# Patient Record
Sex: FEMALE | Race: BLACK OR AFRICAN AMERICAN
[De-identification: names, ages, dates, MRNs, and addresses within clinical notes are randomized per-mention and may not be internally consistent; named-entity substitution may affect disease eponyms.]

---

## 2019-11-07 ENCOUNTER — HOSPITAL ENCOUNTER (INPATIENT)
Dept: HOSPITAL 92 - L&D/OP | Age: 34
LOS: 5 days | Discharge: HOME | End: 2019-11-12
Attending: OBSTETRICS & GYNECOLOGY | Admitting: OBSTETRICS & GYNECOLOGY
Payer: COMMERCIAL

## 2019-11-07 VITALS — BODY MASS INDEX: 30.2 KG/M2

## 2019-11-07 DIAGNOSIS — Z3A.36: ICD-10-CM

## 2019-11-07 DIAGNOSIS — K80.20: ICD-10-CM

## 2019-11-07 LAB
A1 MICROGLOB PLACENTAL VAG QL: (no result)
ALBUMIN SERPL BCG-MCNC: 3.8 G/DL (ref 3.5–5)
ALP SERPL-CCNC: 260 U/L (ref 40–110)
ALT SERPL W P-5'-P-CCNC: 16 U/L (ref 8–55)
AMNISURE INTERNAL CONTROL QC: (no result)
ANION GAP SERPL CALC-SCNC: 15 MMOL/L (ref 10–20)
AST SERPL-CCNC: 26 U/L (ref 5–34)
BILIRUB SERPL-MCNC: 0.4 MG/DL (ref 0.2–1.2)
BUN SERPL-MCNC: 4 MG/DL (ref 7–18.7)
CALCIUM SERPL-MCNC: 9.6 MG/DL (ref 7.8–10.44)
CHLORIDE SERPL-SCNC: 108 MMOL/L (ref 98–107)
CO2 SERPL-SCNC: 19 MMOL/L (ref 22–29)
CREAT CL PREDICTED SERPL C-G-VRATE: 169 ML/MIN (ref 70–130)
GLOBULIN SER CALC-MCNC: 2.5 G/DL (ref 2.4–3.5)
GLUCOSE SERPL-MCNC: 89 MG/DL (ref 70–105)
HBSAG INDEX: 0.17 S/CO (ref 0–0.99)
HGB BLD-MCNC: 12.8 G/DL (ref 12–16)
MCH RBC QN AUTO: 29.4 PG (ref 27–31)
MCV RBC AUTO: 85.1 FL (ref 78–98)
PLATELET # BLD AUTO: 152 THOU/UL (ref 130–400)
POTASSIUM SERPL-SCNC: 4.3 MMOL/L (ref 3.5–5.1)
RBC # BLD AUTO: 4.36 MILL/UL (ref 4.2–5.4)
SODIUM SERPL-SCNC: 138 MMOL/L (ref 136–145)
SYPHILIS ANTIBODY INDEX: 0.03 S/CO
WBC # BLD AUTO: 6 THOU/UL (ref 4.8–10.8)

## 2019-11-07 PROCEDURE — 88307 TISSUE EXAM BY PATHOLOGIST: CPT

## 2019-11-07 PROCEDURE — 84112 EVAL AMNIOTIC FLUID PROTEIN: CPT

## 2019-11-07 PROCEDURE — 87340 HEPATITIS B SURFACE AG IA: CPT

## 2019-11-07 PROCEDURE — 51702 INSERT TEMP BLADDER CATH: CPT

## 2019-11-07 PROCEDURE — 85027 COMPLETE CBC AUTOMATED: CPT

## 2019-11-07 PROCEDURE — 83735 ASSAY OF MAGNESIUM: CPT

## 2019-11-07 PROCEDURE — 86900 BLOOD TYPING SEROLOGIC ABO: CPT

## 2019-11-07 PROCEDURE — 86901 BLOOD TYPING SEROLOGIC RH(D): CPT

## 2019-11-07 PROCEDURE — 86780 TREPONEMA PALLIDUM: CPT

## 2019-11-07 PROCEDURE — 36415 COLL VENOUS BLD VENIPUNCTURE: CPT

## 2019-11-07 PROCEDURE — 85025 COMPLETE CBC W/AUTO DIFF WBC: CPT

## 2019-11-07 PROCEDURE — 86850 RBC ANTIBODY SCREEN: CPT

## 2019-11-07 PROCEDURE — 36416 COLLJ CAPILLARY BLOOD SPEC: CPT

## 2019-11-07 PROCEDURE — 99285 EMERGENCY DEPT VISIT HI MDM: CPT

## 2019-11-07 PROCEDURE — 80053 COMPREHEN METABOLIC PANEL: CPT

## 2019-11-07 NOTE — HP
TIME OF ADMISSION:  1855 hours.



REASON FOR ADMISSION:  A 35 weeks and 6 days gestation with spontaneous rupture of

membranes. 



HISTORY OF PRESENT ILLNESS:  Ms. Durham is a 34-year-old  1, para 0, with EDC

of , who sees Jelena Almeida and Aparna Telles at [facility].  She has had a pregnancy complicated by cholelithiasis, White classification

A1 diabetes with good diet control, mild preeclampsia with proteinuria, and blood

pressures of 140s over 90s in the office.  She reports leakage of fluid this

afternoon.  Upon presentation to the hospital, she was found to have a positive

AmniSure.  Of note, the patient reports she had an ultrasound 2 days ago at [facility], it revealed cephalic presentation with 5-1/2 pounds fetus.

This ultrasound report is not available in the obstetrical record. 



OB/GYN HISTORY:  Blood type B positive.  Antibody negative.  Pap negative.  Rubella

immune.  VDRL nonreactive.  Hepatitis B, GC, chlamydia, HIV negative.  Hemoglobin

electrophoresis within normal limits.  The patient had ASCUS Pap smear. Negative

cystic fibrosis screen.  Negative urine drug screen.  Negative second trimester

serum screening.  She had an elevated 1-hour GTT of 227.  Group B strep has not been

done.  EDC is based on 13-week ultrasound. 



MEDICAL HISTORY:  The patient has known cholelithiasis with __________ pregnancy has

not been symptomatic of recent.  The patient has a history of osteonecrosis of the

right femoral head from childhood is not related to steroid use. 



PAST SURGICAL HISTORY:  None.



ALLERGIES:  NONE.



MEDICATIONS:  Prenatal vitamins.



SOCIAL HISTORY:  Denies tobacco, alcohol, or IV drug abuse.



FAMILY HISTORY:  Noncontributory.



REVIEW OF SYSTEMS:  Noncontributory.



PHYSICAL EXAMINATION:

GENERAL:  Pleasant black female, in no acute distress. 

VITAL SIGNS:  Blood pressure 138/88, 187 pounds, trace edema, pulse 85, respirations

18, temperature 98.6. 

HEENT:  Within normal limits. 

LUNGS:  Clear to auscultation bilaterally. 

HEART:  Regular rhythm. 

ABDOMEN:  Soft and nontender with a fundal height of 36.  FHTs 140s. 

VULVA:  Without lesions. 

VAGINA:  Clear fluid. 

CERVIX:  Fingertip 20% effaced, -4, cephalic posterior, and soft. 

EXTREMITIES:  Without clubbing, cyanosis, or edema. 



Fetal monitoring reveals contractions approximately every 5 minutes, category I

fetal heart rate tracing. 



IMPRESSION:  

1. 35 to 36 weeks gestation.

2. Premature rupture of membranes.

3. White class A1 diabetes.

4. Mild preeclampsia.

5. Cholelithiasis.

6. History of osteonecrosis of the hip.



PLAN:  As the patient is 35 to 36 weeks gestation, we will administer 

corticosteroids.  The patient's osteonecrosis is not related to steroid use and so

there should be no contraindication to use of  corticosteroids just because

of the patient's history of osteonecrosis.  We will check PIH labs and will follow

blood pressures.  If severe range features are noted, we will administer magnesium

sulfate.  The patient's diabetes is diet control and no insulin use or serial blood

sugars are anticipated.  Because of pre-36-week rupture of membranes, we will

administer penicillin for group B strep prophylaxis.  We will consider

discontinuation in a.m. if patient is still not in labor.  If the patient progresses

without being in labor approximately 12 hours after initial  corticosteroid

dose, may consider oral Cytotec and possibly accelerated second dose of 

corticosteroids.  We will perform  delivery for maternal or fetal

indications, but none for the  delivery present at this time.  Care plans

discussed with Jelena Almeida, certified nurse midwife. 







Job ID:  [id number]

## 2019-11-08 RX ADMIN — Medication SCH MLS: at 13:28

## 2019-11-08 RX ADMIN — MAGNESIUM SULFATE HEPTAHYDRATE SCH MLS: 40 INJECTION, SOLUTION INTRAVENOUS at 15:23

## 2019-11-08 RX ADMIN — Medication SCH MLS: at 17:35

## 2019-11-08 RX ADMIN — Medication SCH MLS: at 00:18

## 2019-11-08 RX ADMIN — MAGNESIUM SULFATE HEPTAHYDRATE SCH MLS: 40 INJECTION, SOLUTION INTRAVENOUS at 23:26

## 2019-11-08 RX ADMIN — Medication SCH MLS: at 09:05

## 2019-11-08 RX ADMIN — Medication SCH MLS: at 04:36

## 2019-11-08 RX ADMIN — Medication SCH MLS: at 21:06

## 2019-11-08 NOTE — PDOC.LDPN
Labor & Delivery Progress Note





- Subjective


Subjective: painful contractions





- Objective


Abnormal vital signs: severe range pressures 168/90


General: breathing through contractions


Uterine fundus: non tender


Dilation: 2


Effacement: 90%


Station: -1


FHT: category 1


[place] contractions every: q2-2.5 mins





- Assessment


(1) Preeclampsia


Code(s): O14.90 - UNSPECIFIED PRE-ECLAMPSIA, UNSPECIFIED TRIMESTER   Current 

Visit: Yes   Status: Acute   





(2)  premature rupture of membranes


Code(s): O42.919 - PRETRM ALEXA ROM, UNSP TIME BETW RUPT AND ONST LABR, UNSP TRI

   Current Visit: Yes   Status: Acute   


Plan: pitocin for augmentation (Strongly recommended epidural to help low blood 

pressure as well as control pain. patient declined.)


-: 





Order for IV hydralazine [id number] for severe range BPs.

## 2019-11-08 NOTE — PRG
DATE OF SERVICE:  2019



SUBJECTIVE:  The patient has been resting comfortably through the night.  She has

had elevated blood pressures throughout.  She required Apresoline x1 dose at

approximately around midnight for systolic greater than 160.  Since then, patient's

systolics have been in the 130s.  She denies headache, scotoma, or right upper

quadrant pain.  Fetal heart rate tracing has been category I.  Contractions have

been q.5 to 10 minutes and are mild at most.  The patient started continued leakage

of fluid.  Vaginal exam is deferred this morning. 



Laboratory from admission includes platelet count of 152, hematocrit of 37%.  Normal

LFTs.  The patient was noted to have an elevated protein creatinine ratio in the

office. 



We await ultrasound report from [facility] from earlier in the

week that confirmed the patient's report of 5-1/2 pound infant in cephalic

presentation. 



IMPRESSION:   premature rupture of the membranes, 36 weeks, mild preeclampsia

with occasional severe range blood pressures without symptomatology. 



PLAN:  Discussed with the patient care of plan.  Since the patient is now 36 weeks

and blood pressures have been somewhat elevated, we will accelerate second dose of

 corticosteroids to now.  We will initiate Cytotec 50 mcg q.2 hours x4

doses and anticipate Pitocin induction of labor afterwards.  We will administer

magnesium sulfate if severe range pressures reappear.  We will discuss care plan

with Dr. Burleson, OB hospitalist, taking over at 8:00 am as well as Jelena Almeida,

certified nurse midwife. 







Job ID:  [id number]

## 2019-11-09 LAB
ALBUMIN SERPL BCG-MCNC: 3.2 G/DL (ref 3.5–5)
ALP SERPL-CCNC: 208 U/L (ref 40–110)
ALT SERPL W P-5'-P-CCNC: 13 U/L (ref 8–55)
ANION GAP SERPL CALC-SCNC: 14 MMOL/L (ref 10–20)
AST SERPL-CCNC: 18 U/L (ref 5–34)
BASOPHILS # BLD AUTO: 0 THOU/UL (ref 0–0.2)
BASOPHILS NFR BLD AUTO: 0.1 % (ref 0–1)
BILIRUB SERPL-MCNC: 0.4 MG/DL (ref 0.2–1.2)
BUN SERPL-MCNC: 8 MG/DL (ref 7–18.7)
CALCIUM SERPL-MCNC: 7.1 MG/DL (ref 7.8–10.44)
CHLORIDE SERPL-SCNC: 105 MMOL/L (ref 98–107)
CO2 SERPL-SCNC: 18 MMOL/L (ref 22–29)
CREAT CL PREDICTED SERPL C-G-VRATE: 144 ML/MIN (ref 70–130)
EOSINOPHIL # BLD AUTO: 0.1 THOU/UL (ref 0–0.7)
EOSINOPHIL NFR BLD AUTO: 0.5 % (ref 0–10)
GLOBULIN SER CALC-MCNC: 2.5 G/DL (ref 2.4–3.5)
GLUCOSE SERPL-MCNC: 174 MG/DL (ref 70–105)
HGB BLD-MCNC: 11.5 G/DL (ref 12–16)
LYMPHOCYTES # BLD: 1.4 THOU/UL (ref 1.2–3.4)
LYMPHOCYTES NFR BLD AUTO: 8 % (ref 21–51)
MCH RBC QN AUTO: 30 PG (ref 27–31)
MCV RBC AUTO: 88.7 FL (ref 78–98)
MONOCYTES # BLD AUTO: 1 THOU/UL (ref 0.11–0.59)
MONOCYTES NFR BLD AUTO: 5.4 % (ref 0–10)
NEUTROPHILS # BLD AUTO: 15.2 THOU/UL (ref 1.4–6.5)
NEUTROPHILS NFR BLD AUTO: 86 % (ref 42–75)
PLATELET # BLD AUTO: 141 THOU/UL (ref 130–400)
POTASSIUM SERPL-SCNC: 4.3 MMOL/L (ref 3.5–5.1)
RBC # BLD AUTO: 3.84 MILL/UL (ref 4.2–5.4)
SODIUM SERPL-SCNC: 133 MMOL/L (ref 136–145)
WBC # BLD AUTO: 17.7 THOU/UL (ref 4.8–10.8)

## 2019-11-09 PROCEDURE — 10907ZC DRAINAGE OF AMNIOTIC FLUID, THERAPEUTIC FROM PRODUCTS OF CONCEPTION, VIA NATURAL OR ARTIFICIAL OPENING: ICD-10-PCS | Performed by: OBSTETRICS & GYNECOLOGY

## 2019-11-09 PROCEDURE — 0HQ9XZZ REPAIR PERINEUM SKIN, EXTERNAL APPROACH: ICD-10-PCS | Performed by: OBSTETRICS & GYNECOLOGY

## 2019-11-09 RX ADMIN — Medication SCH MLS: at 09:13

## 2019-11-09 RX ADMIN — Medication SCH MLS: at 05:22

## 2019-11-09 RX ADMIN — MAGNESIUM SULFATE HEPTAHYDRATE SCH MLS: 40 INJECTION, SOLUTION INTRAVENOUS at 09:51

## 2019-11-09 RX ADMIN — Medication SCH MLS: at 01:30

## 2019-11-09 NOTE — PDOC.LDPN
Labor & Delivery Progress Note





- Subjective


Subjective: comfortable





- Objective


Abnormal vital signs: 180/98 sever range at 0807.  P111


General: resting


Uterine fundus: tender to palpation


Dilation: 9


Effacement: 100%


Station: 0


FHT: category 1


[place] contractions every: 4


AROM: clear fluid (forebag ruptured. clear)


Resuscitative measures: maternal oxygen





- Assessment


(1) Preeclampsia


Code(s): O14.90 - UNSPECIFIED PRE-ECLAMPSIA, UNSPECIFIED TRIMESTER   Current 

Visit: Yes   Status: Acute   





(2)  premature rupture of membranes


Code(s): O42.919 - PRETRM ALEXA ROM, UNSP TIME BETW RUPT AND ONST LABR, UNSP TRI

   Current Visit: Yes   Status: Acute   


Plan: continue plan of care, pitocin for augmentation


-: 


I called patient at 0830 to discuss starting an epidural.  Patient has severe 

range blood pressures and has had IV hydralizine 4 times.  Discussed risks of 

cardiovascular events including stroke.  Also discussed risks of eclampsia.  

Offered PCS vs. epidural at this time in order to continue with the induction.  

Patient agreed to an epidural.

## 2019-11-09 NOTE — PDOC.OPDEL
OB Operative/Delivery Note


Delivery Dr/Surgeon: MAGNO Almeida 


Pre-Delivery Diagnosis: ruptured membrane, other ( gestation Gestational 

diabetes Preeclampsia)


Procedure/Post Delivery Dx: other (preeclampsia)


Weeks gestation: 36


Anesthesia: epidural





- Findings


  ** A


Sex: female


Apgar - 1 min: 8


Apgar - 5 min: 9





- Additional Findings/Plan


Placenta delivered: spontaneous


Repaired Obstetrical Laceration: 1st degree


Post delivery plan: recovery in LICU

## 2019-11-10 RX ADMIN — Medication SCH: at 15:57

## 2019-11-10 RX ADMIN — DOCUSATE CALCIUM SCH MG: 240 CAPSULE, LIQUID FILLED ORAL at 21:43

## 2019-11-10 NOTE — PDOC.PP
Post Partum Progress Note


Post Partum Day #: PPD1


Subjective: 


Denies HA or blurry vision.


PO intake tolerated: yes


Flatus: yes


Ambulation: no


 Weight











Weight                         84.822 kg

















- Physical Examination


General: NAD


Respiratory: non-labored breathing


Abdominal: no distention


Neurological: no gross focal deficits


Result Diagrams: 


 19 14:31





 19 14:31


Additional Labs: 


 Post Partum Labs











Blood Type  B POSITIVE   19  20:26    


 


Hep Bs Antigen  Non-Reactive S/CO (NonReactive)   19  20:06    














- Assessment/Plan


S/p , doing well.


Resolving PIH.


Cont. Mg x 24 hrs delivered.

## 2019-11-11 RX ADMIN — DOCUSATE CALCIUM SCH MG: 240 CAPSULE, LIQUID FILLED ORAL at 07:59

## 2019-11-11 RX ADMIN — DOCUSATE CALCIUM SCH MG: 240 CAPSULE, LIQUID FILLED ORAL at 21:37

## 2019-11-11 RX ADMIN — NIFEDIPINE SCH MG: 30 TABLET, FILM COATED, EXTENDED RELEASE ORAL at 07:59

## 2019-11-11 NOTE — PDOC.PP
Post Partum Progress Note


Post Partum Day #: 2


Subjective: 





Post partum day 2.  Patient would like to stay another day postpartum


PO intake tolerated: yes


Flatus: yes


Ambulation: yes


 Vital Signs (12 hours)











  Temp Pulse Resp BP Pulse Ox


 


 19 19:31  99.6 F  99  16  146/73 H  97


 


 19 18:00  98.9 F  90  12  139/83  97


 


 19 11:32  99.1 F  104 H  20  147/84 H 








 Weight











Weight                         187 lb

















- Physical Examination


General: NAD


Respiratory: non-labored breathing


Abdominal: lochia (minimal)


Extremities: negative homans (B)


Skin: no rash


Neurological: no gross focal deficits


Psychiatric: A&Ox3, normal affect


Result Diagrams: 


 19 14:31





 19 14:31


Additional Labs: 


 Post Partum Labs











Blood Type  B POSITIVE   19  20:26    


 


Hep Bs Antigen  Non-Reactive S/CO (NonReactive)   19  20:06    








 Selected Entries











  19





  16:07 08:07


 


Blood Pressure 168/90 H 180/98 H








 Laboratory Tests











  19





  14:31 14:31


 


WBC   17.7 H


 


Hgb   11.5 L


 


Hct   34.1 L


 


Plt Count   141


 


AST  18 


 


ALT  13 











(1) Preeclampsia


Code(s): O14.90 - UNSPECIFIED PRE-ECLAMPSIA, UNSPECIFIED TRIMESTER   Status: 

Acute   





(2)  premature rupture of membranes


Code(s): O42.919 - PRETRM ALEXA ROM, UNSP TIME BETW RUPT AND ONST LABR, UNSP TRI

   Status: Acute   





(3)  (spontaneous vaginal delivery)


Code(s): O80 - ENCOUNTER FOR FULL-TERM UNCOMPLICATED DELIVERY   Status: Acute   





- Assessment/Plan





A: g1 now p1 s/p  complicated prior to delivery by PPROM and Preeclampsia.  


NML PPD2 exam with exception of mild range blood pressures, which has been 

reduced significantly since delivery and an elevated WBCs. liver enzymes and 

platelets are WNML since delivery. 


P: observe for s/s of infection, pt is at higher risk for endometritis due to 

PPROM and prolonged rupture of membranes


Treat blood pressure if severe range pressures return.


Evaluate for discharge home tomorrow if clinically appropriate.

## 2019-11-12 VITALS — TEMPERATURE: 98.6 F | DIASTOLIC BLOOD PRESSURE: 93 MMHG | SYSTOLIC BLOOD PRESSURE: 137 MMHG

## 2019-11-12 RX ADMIN — DOCUSATE CALCIUM SCH MG: 240 CAPSULE, LIQUID FILLED ORAL at 09:51

## 2019-11-12 RX ADMIN — NIFEDIPINE SCH MG: 30 TABLET, FILM COATED, EXTENDED RELEASE ORAL at 09:52

## 2019-11-12 NOTE — PDOC.PP
Post Partum Progress Note


Post Partum Day #: 3


Subjective: 


No concerns. No pre E sx. Breast feeding. Minimal pain and lochia. 


PO intake tolerated: yes


Flatus: yes


Ambulation: yes


 Vital Signs (12 hours)











  Temp Pulse Resp BP


 


 19 23:30  98.9 F  87  20  142/65 H








 Weight











Weight                         187 lb

















- Physical Examination


General: NAD


Cardiovascular: RRR


Respiratory: non-labored breathing


Abdominal: no distention, appropriately TTP


Fundus firm & at: below umbilicus 


Extremities: negative homans (B)


Neurological: no gross focal deficits


Psychiatric: A&Ox3, normal affect


Result Diagrams: 


 19 14:31





 19 14:31


Additional Labs: 


 Post Partum Labs











Blood Type  B POSITIVE   19  20:26    


 


Hep Bs Antigen  Non-Reactive S/CO (NonReactive)   19  20:06    











(1)  (spontaneous vaginal delivery)


Code(s): O80 - ENCOUNTER FOR FULL-TERM UNCOMPLICATED DELIVERY   Status: Acute   





- Assessment/Plan


PPD3


Mild BPs, otherwise VSS. 


BPs stale without meds. 


Plan for d/c home today with daily BP monitoring and f/u 1 week for BP check. 


Reviewed preE warning signs.

## 2021-12-15 ENCOUNTER — HOSPITAL ENCOUNTER (INPATIENT)
Dept: HOSPITAL 92 - CSHLD/OP | Age: 36
LOS: 2 days | Discharge: HOME | End: 2021-12-17
Attending: OBSTETRICS & GYNECOLOGY | Admitting: OBSTETRICS & GYNECOLOGY
Payer: COMMERCIAL

## 2021-12-15 VITALS — BODY MASS INDEX: 32.4 KG/M2

## 2021-12-15 DIAGNOSIS — Z20.822: ICD-10-CM

## 2021-12-15 DIAGNOSIS — Z3A.37: ICD-10-CM

## 2021-12-15 LAB
ANALYZER IN CARDIO: (no result)
ANALYZER IN CARDIO: (no result)
HBSAG INDEX: 0.17 S/CO (ref 0–0.99)
HGB BLD-MCNC: 10.4 G/DL (ref 12–15.5)
MCH RBC QN AUTO: 25.2 PG (ref 27–33)
MCV RBC AUTO: 78.2 FL (ref 81.6–98.3)
PH BLDV: 7.24 [PH] (ref 7.25–7.35)
PLATELET # BLD AUTO: 202 10X3/UL (ref 150–450)
RBC # BLD AUTO: 4.13 10X6/UL (ref 3.9–5.03)
SYPHILIS ANTIBODY INDEX: 0.02 S/CO
WBC # BLD AUTO: 6.4 10X3/UL (ref 3.5–10.5)

## 2021-12-15 PROCEDURE — 82805 BLOOD GASES W/O2 SATURATION: CPT

## 2021-12-15 PROCEDURE — 86850 RBC ANTIBODY SCREEN: CPT

## 2021-12-15 PROCEDURE — 36415 COLL VENOUS BLD VENIPUNCTURE: CPT

## 2021-12-15 PROCEDURE — 86901 BLOOD TYPING SEROLOGIC RH(D): CPT

## 2021-12-15 PROCEDURE — 86780 TREPONEMA PALLIDUM: CPT

## 2021-12-15 PROCEDURE — 85027 COMPLETE CBC AUTOMATED: CPT

## 2021-12-15 PROCEDURE — 86900 BLOOD TYPING SEROLOGIC ABO: CPT

## 2021-12-15 PROCEDURE — 87340 HEPATITIS B SURFACE AG IA: CPT

## 2021-12-15 PROCEDURE — U0002 COVID-19 LAB TEST NON-CDC: HCPCS

## 2021-12-15 RX ADMIN — DOCUSATE CALCIUM SCH MG: 240 CAPSULE, LIQUID FILLED ORAL at 20:36

## 2021-12-16 RX ADMIN — DOCUSATE CALCIUM SCH MG: 240 CAPSULE, LIQUID FILLED ORAL at 21:57

## 2021-12-16 RX ADMIN — DOCUSATE CALCIUM SCH MG: 240 CAPSULE, LIQUID FILLED ORAL at 08:22

## 2021-12-17 VITALS — SYSTOLIC BLOOD PRESSURE: 119 MMHG | DIASTOLIC BLOOD PRESSURE: 72 MMHG | TEMPERATURE: 98 F

## 2021-12-17 RX ADMIN — DOCUSATE CALCIUM SCH MG: 240 CAPSULE, LIQUID FILLED ORAL at 08:48

## 2022-08-05 ENCOUNTER — HOSPITAL ENCOUNTER (OUTPATIENT)
Dept: HOSPITAL 92 - SCSRAD | Age: 37
Discharge: HOME | End: 2022-08-05
Attending: CHIROPRACTOR
Payer: COMMERCIAL

## 2022-08-05 DIAGNOSIS — S23.41XA: Primary | ICD-10-CM

## 2022-08-05 PROCEDURE — 71110 X-RAY EXAM RIBS BIL 3 VIEWS: CPT
